# Patient Record
Sex: FEMALE | Race: BLACK OR AFRICAN AMERICAN | URBAN - METROPOLITAN AREA
[De-identification: names, ages, dates, MRNs, and addresses within clinical notes are randomized per-mention and may not be internally consistent; named-entity substitution may affect disease eponyms.]

---

## 2021-09-29 ENCOUNTER — OFFICE VISIT (OUTPATIENT)
Dept: URBAN - METROPOLITAN AREA CLINIC 115 | Facility: CLINIC | Age: 56
End: 2021-09-29

## 2021-10-25 ENCOUNTER — OFFICE VISIT (OUTPATIENT)
Dept: URBAN - METROPOLITAN AREA CLINIC 115 | Facility: CLINIC | Age: 56
End: 2021-10-25
Payer: COMMERCIAL

## 2021-10-25 ENCOUNTER — WEB ENCOUNTER (OUTPATIENT)
Dept: URBAN - METROPOLITAN AREA CLINIC 115 | Facility: CLINIC | Age: 56
End: 2021-10-25

## 2021-10-25 ENCOUNTER — LAB OUTSIDE AN ENCOUNTER (OUTPATIENT)
Dept: URBAN - METROPOLITAN AREA CLINIC 115 | Facility: CLINIC | Age: 56
End: 2021-10-25

## 2021-10-25 VITALS
TEMPERATURE: 97.1 F | DIASTOLIC BLOOD PRESSURE: 78 MMHG | HEIGHT: 58 IN | WEIGHT: 157.2 LBS | BODY MASS INDEX: 33 KG/M2 | SYSTOLIC BLOOD PRESSURE: 125 MMHG | HEART RATE: 77 BPM

## 2021-10-25 DIAGNOSIS — K44.9 HIATAL HERNIA: ICD-10-CM

## 2021-10-25 DIAGNOSIS — K22.10 EROSIVE ESOPHAGITIS: ICD-10-CM

## 2021-10-25 DIAGNOSIS — K59.09 CHANGE IN BOWEL MOVEMENTS INTERMITTENT CONSTIPATION. URGENCY IN THE MORNING.: ICD-10-CM

## 2021-10-25 DIAGNOSIS — R07.9 CHEST PAIN, UNSPECIFIED TYPE: ICD-10-CM

## 2021-10-25 DIAGNOSIS — R10.11 RUQ PAIN: ICD-10-CM

## 2021-10-25 DIAGNOSIS — R13.19 ESOPHAGEAL DYSPHAGIA: ICD-10-CM

## 2021-10-25 DIAGNOSIS — Z12.11 COLON CANCER SCREENING: ICD-10-CM

## 2021-10-25 PROCEDURE — 99244 OFF/OP CNSLTJ NEW/EST MOD 40: CPT | Performed by: INTERNAL MEDICINE

## 2021-10-25 PROCEDURE — 99204 OFFICE O/P NEW MOD 45 MIN: CPT | Performed by: INTERNAL MEDICINE

## 2021-10-25 RX ORDER — PANTOPRAZOLE SODIUM 40 MG/1
1 TABLET TABLET, DELAYED RELEASE ORAL ONCE A DAY
Qty: 30 TABLET | Refills: 3 | OUTPATIENT
Start: 2021-10-25

## 2021-10-25 RX ORDER — LINACLOTIDE 290 UG/1
1 CAPSULE AT LEAST 30 MINUTES BEFORE THE FIRST MEAL OF THE DAY ON AN EMPTY STOMACH CAPSULE, GELATIN COATED ORAL ONCE A DAY
Qty: 90 | Refills: 3 | OUTPATIENT
Start: 2021-10-25 | End: 2022-10-20

## 2021-10-25 NOTE — HPI-TODAY'S VISIT:
THROAT CATCHING FOOD, WHILE EAT FOOD GET CAUGHT, WHEN EATING BREADING, SLEEP MUSCLE CLOSES  NO SNORE  MEAT DONT GET STUCK. , BREAD GET EXPAND WITH WATER AND GET STUCK, GOING ON FOR 3 MONTHS,  2-3 TIMES A MONTH  IN PAST ESOPHAGUS STRETCHE,   DOES HAVE PAIN IN CHEST, AND BACK PAIN  DOES HAVE ACID COMING BACK IN THROAT.  RUQ AREA BURNING PAIN, BALL LIKE, FOR LONG TIME  BM CONSTIPATION. ONLY 2 TIMES A WEEK.

## 2021-10-26 ENCOUNTER — TELEPHONE ENCOUNTER (OUTPATIENT)
Dept: URBAN - METROPOLITAN AREA CLINIC 92 | Facility: CLINIC | Age: 56
End: 2021-10-26

## 2021-10-26 LAB
A/G RATIO: 1.6
ALBUMIN: 4.5
ALKALINE PHOSPHATASE: 75
ALT (SGPT): 16
AST (SGOT): 14
BASO (ABSOLUTE): 0
BASOS: 1
BILIRUBIN, TOTAL: 0.7
BUN/CREATININE RATIO: 12
BUN: 14
CALCIUM: 9.1
CARBON DIOXIDE, TOTAL: 23
CHLORIDE: 109
CREATININE: 1.15
EGFR IF AFRICN AM: 62
EGFR IF NONAFRICN AM: 54
EOS (ABSOLUTE): 0.1
EOS: 3
GLOBULIN, TOTAL: 2.8
GLUCOSE: 98
HEMATOCRIT: 32.8
HEMATOLOGY COMMENTS:: (no result)
HEMOGLOBIN: 10.7
IMMATURE CELLS: (no result)
IMMATURE GRANS (ABS): 0
IMMATURE GRANULOCYTES: 0
LIPASE: 22
LYMPHS (ABSOLUTE): 1.4
LYMPHS: 35
MCH: 30.1
MCHC: 32.6
MCV: 92
MONOCYTES(ABSOLUTE): 0.2
MONOCYTES: 6
NEUTROPHILS (ABSOLUTE): 2.2
NEUTROPHILS: 55
NRBC: (no result)
PLATELETS: 250
POTASSIUM: 4.2
PROTEIN, TOTAL: 7.3
RBC: 3.55
RDW: 14.3
SODIUM: 143
TSH: 1.85
WBC: 3.9

## 2021-10-28 ENCOUNTER — TELEPHONE ENCOUNTER (OUTPATIENT)
Dept: URBAN - METROPOLITAN AREA CLINIC 115 | Facility: CLINIC | Age: 56
End: 2021-10-28

## 2021-11-01 ENCOUNTER — LAB OUTSIDE AN ENCOUNTER (OUTPATIENT)
Dept: URBAN - METROPOLITAN AREA CLINIC 82 | Facility: CLINIC | Age: 56
End: 2021-11-01

## 2021-11-18 LAB
FERRITIN, SERUM: 274
IRON BIND.CAP.(TIBC): 288
IRON SATURATION: 22
IRON: 63
UIBC: 225
WRITTEN AUTHORIZATION: (no result)

## 2021-11-24 ENCOUNTER — TELEPHONE ENCOUNTER (OUTPATIENT)
Dept: URBAN - METROPOLITAN AREA CLINIC 115 | Facility: CLINIC | Age: 56
End: 2021-11-24

## 2021-12-08 ENCOUNTER — WEB ENCOUNTER (OUTPATIENT)
Dept: URBAN - METROPOLITAN AREA CLINIC 115 | Facility: CLINIC | Age: 56
End: 2021-12-08

## 2021-12-08 ENCOUNTER — OFFICE VISIT (OUTPATIENT)
Dept: URBAN - METROPOLITAN AREA CLINIC 115 | Facility: CLINIC | Age: 56
End: 2021-12-08
Payer: COMMERCIAL

## 2021-12-08 VITALS
DIASTOLIC BLOOD PRESSURE: 83 MMHG | WEIGHT: 159.6 LBS | SYSTOLIC BLOOD PRESSURE: 128 MMHG | HEART RATE: 86 BPM | HEIGHT: 58 IN | TEMPERATURE: 97.3 F | BODY MASS INDEX: 33.5 KG/M2

## 2021-12-08 DIAGNOSIS — D64.9 ANEMIA, UNSPECIFIED TYPE: ICD-10-CM

## 2021-12-08 DIAGNOSIS — Z12.11 COLON CANCER SCREENING: ICD-10-CM

## 2021-12-08 DIAGNOSIS — R07.9 CHEST PAIN, UNSPECIFIED TYPE: ICD-10-CM

## 2021-12-08 DIAGNOSIS — K22.10 EROSIVE ESOPHAGITIS: ICD-10-CM

## 2021-12-08 DIAGNOSIS — K44.9 HIATAL HERNIA: ICD-10-CM

## 2021-12-08 DIAGNOSIS — R13.19 ESOPHAGEAL DYSPHAGIA: ICD-10-CM

## 2021-12-08 DIAGNOSIS — R10.11 RUQ PAIN: ICD-10-CM

## 2021-12-08 DIAGNOSIS — K59.01 SLOW TRANSIT CONSTIPATION: ICD-10-CM

## 2021-12-08 PROBLEM — 40719004: Status: ACTIVE | Noted: 2021-10-25

## 2021-12-08 PROBLEM — 35298007: Status: ACTIVE | Noted: 2021-10-25

## 2021-12-08 PROBLEM — 40890009: Status: ACTIVE | Noted: 2021-10-25

## 2021-12-08 PROBLEM — 301717006: Status: ACTIVE | Noted: 2021-10-25

## 2021-12-08 PROBLEM — 84089009: Status: ACTIVE | Noted: 2021-10-25

## 2021-12-08 PROBLEM — 271737000: Status: ACTIVE | Noted: 2021-12-08

## 2021-12-08 PROCEDURE — 99214 OFFICE O/P EST MOD 30 MIN: CPT | Performed by: INTERNAL MEDICINE

## 2021-12-08 RX ORDER — PANTOPRAZOLE SODIUM 40 MG/1
1 TABLET TABLET, DELAYED RELEASE ORAL ONCE A DAY
Qty: 30 TABLET | Refills: 3 | Status: ACTIVE | COMMUNITY
Start: 2021-10-25

## 2021-12-08 RX ORDER — PANTOPRAZOLE SODIUM 40 MG/1
1 TABLET TABLET, DELAYED RELEASE ORAL ONCE A DAY
Qty: 30 TABLET | Refills: 3 | OUTPATIENT

## 2021-12-08 RX ORDER — LORATADINE 10 MG
1 PACKET MIXED WITH 8 OUNCES OF FLUID TABLET,DISINTEGRATING ORAL ONCE A DAY
Qty: 30 | OUTPATIENT
Start: 2021-12-08 | End: 2022-01-07

## 2021-12-08 RX ORDER — LINACLOTIDE 290 UG/1
1 CAPSULE AT LEAST 30 MINUTES BEFORE THE FIRST MEAL OF THE DAY ON AN EMPTY STOMACH CAPSULE, GELATIN COATED ORAL ONCE A DAY
Qty: 90 | Refills: 3 | Status: ACTIVE | COMMUNITY
Start: 2021-10-25 | End: 2022-10-20

## 2021-12-08 NOTE — HPI-TODAY'S VISIT:
GAVE LABS TO HER, TO TAKE TO PCP FOR ANEMIA, AND ELEVATED CREATITINE   LINZESS NOT HELPING, NEED TO STOP ,   RUQ PAIN, BLOATING  SWALLOWING IS GOOD, LITTLE REFLUX.

## 2022-01-03 ENCOUNTER — OFFICE VISIT (OUTPATIENT)
Dept: URBAN - METROPOLITAN AREA CLINIC 115 | Facility: CLINIC | Age: 57
End: 2022-01-03

## 2023-05-22 ENCOUNTER — OFFICE VISIT (OUTPATIENT)
Dept: URBAN - METROPOLITAN AREA CLINIC 17 | Facility: CLINIC | Age: 58
End: 2023-05-22

## 2023-05-22 RX ORDER — PANTOPRAZOLE SODIUM 40 MG/1
1 TABLET TABLET, DELAYED RELEASE ORAL ONCE A DAY
Qty: 30 TABLET | Refills: 3 | Status: ACTIVE | COMMUNITY

## 2023-06-05 ENCOUNTER — OFFICE VISIT (OUTPATIENT)
Dept: URBAN - METROPOLITAN AREA CLINIC 17 | Facility: CLINIC | Age: 58
End: 2023-06-05

## 2023-07-31 ENCOUNTER — OFFICE VISIT (OUTPATIENT)
Dept: URBAN - METROPOLITAN AREA CLINIC 17 | Facility: CLINIC | Age: 58
End: 2023-07-31

## 2023-09-11 ENCOUNTER — OFFICE VISIT (OUTPATIENT)
Dept: URBAN - METROPOLITAN AREA CLINIC 52 | Facility: CLINIC | Age: 58
End: 2023-09-11

## 2023-09-11 RX ORDER — PANTOPRAZOLE SODIUM 40 MG/1
1 TABLET TABLET, DELAYED RELEASE ORAL ONCE A DAY
Qty: 30 TABLET | Refills: 3 | Status: ON HOLD | COMMUNITY

## 2023-10-23 ENCOUNTER — LAB OUTSIDE AN ENCOUNTER (OUTPATIENT)
Dept: URBAN - METROPOLITAN AREA CLINIC 52 | Facility: CLINIC | Age: 58
End: 2023-10-23

## 2023-10-23 ENCOUNTER — OFFICE VISIT (OUTPATIENT)
Dept: URBAN - METROPOLITAN AREA CLINIC 52 | Facility: CLINIC | Age: 58
End: 2023-10-23
Payer: COMMERCIAL

## 2023-10-23 VITALS
BODY MASS INDEX: 31.02 KG/M2 | HEIGHT: 58 IN | HEART RATE: 85 BPM | OXYGEN SATURATION: 97 % | WEIGHT: 147.8 LBS | DIASTOLIC BLOOD PRESSURE: 70 MMHG | TEMPERATURE: 99.3 F | SYSTOLIC BLOOD PRESSURE: 116 MMHG

## 2023-10-23 DIAGNOSIS — G89.29 OTHER CHRONIC PAIN: ICD-10-CM

## 2023-10-23 DIAGNOSIS — K59.01 CONSTIPATION: ICD-10-CM

## 2023-10-23 DIAGNOSIS — R10.31 RIGHT LOWER QUADRANT PAIN: ICD-10-CM

## 2023-10-23 PROBLEM — 14760008: Status: ACTIVE | Noted: 2023-10-23

## 2023-10-23 PROBLEM — 82423001: Status: ACTIVE | Noted: 2023-10-23

## 2023-10-23 PROCEDURE — 99204 OFFICE O/P NEW MOD 45 MIN: CPT | Performed by: INTERNAL MEDICINE

## 2023-10-23 PROCEDURE — 99214 OFFICE O/P EST MOD 30 MIN: CPT | Performed by: INTERNAL MEDICINE

## 2023-10-23 RX ORDER — POLYETHYLENE GLYCOL 3350, SODIUM CHLORIDE, SODIUM BICARBONATE, POTASSIUM CHLORIDE 420; 11.2; 5.72; 1.48 G/4L; G/4L; G/4L; G/4L
AS DIRECTED POWDER, FOR SOLUTION ORAL
Qty: 4000 | Refills: 0 | OUTPATIENT
Start: 2023-10-23 | End: 2023-10-24

## 2023-10-23 RX ORDER — PANTOPRAZOLE SODIUM 40 MG/1
1 TABLET TABLET, DELAYED RELEASE ORAL ONCE A DAY
Qty: 30 TABLET | Refills: 3 | Status: ON HOLD | COMMUNITY

## 2023-10-23 RX ORDER — SENNOSIDES 8.6 MG/1
2 TABLETS AT BEDTIME AS NEEDED TABLET ORAL ONCE A DAY
Qty: 60 | Refills: 5 | OUTPATIENT
Start: 2023-10-23 | End: 2024-04-20

## 2023-10-23 NOTE — HPI-TODAY'S VISIT:
2 years bad pain RLQ, transient. Worsening though. Gas/belching Mucous in stool. Some dark red in stool. No diarrhea. Chronic constipation. Fiber not helpful.  8/2018-EGD-"B" esophagitis. 3 mm HH. Colonoscopy-IH. Divertics sigm. [id number]. Tablet passed easily. 5/2023-Hgb-11.2 Ultrasound 2022-"NL" (verbal per pt) CT (or MRI) 2022-"NL" (verbal per pt)

## 2023-11-03 ENCOUNTER — TELEPHONE ENCOUNTER (OUTPATIENT)
Dept: URBAN - METROPOLITAN AREA CLINIC 52 | Facility: CLINIC | Age: 58
End: 2023-11-03

## 2023-11-07 ENCOUNTER — TELEPHONE ENCOUNTER (OUTPATIENT)
Dept: URBAN - METROPOLITAN AREA CLINIC 52 | Facility: CLINIC | Age: 58
End: 2023-11-07

## 2023-11-13 ENCOUNTER — TELEPHONE ENCOUNTER (OUTPATIENT)
Dept: URBAN - METROPOLITAN AREA CLINIC 52 | Facility: CLINIC | Age: 58
End: 2023-11-13

## 2023-11-13 RX ORDER — SODIUM, POTASSIUM,MAG SULFATES 17.5-3.13G
AS DIRECTED SOLUTION, RECONSTITUTED, ORAL ORAL
OUTPATIENT
Start: 2023-11-14

## 2023-12-11 ENCOUNTER — TELEPHONE ENCOUNTER (OUTPATIENT)
Dept: URBAN - METROPOLITAN AREA CLINIC 52 | Facility: CLINIC | Age: 58
End: 2023-12-11

## 2023-12-12 ENCOUNTER — TELEPHONE ENCOUNTER (OUTPATIENT)
Dept: URBAN - METROPOLITAN AREA CLINIC 52 | Facility: CLINIC | Age: 58
End: 2023-12-12

## 2023-12-12 ENCOUNTER — LAB OUTSIDE AN ENCOUNTER (OUTPATIENT)
Dept: URBAN - METROPOLITAN AREA CLINIC 52 | Facility: CLINIC | Age: 58
End: 2023-12-12

## 2023-12-13 ENCOUNTER — CLAIMS CREATED FROM THE CLAIM WINDOW (OUTPATIENT)
Dept: URBAN - METROPOLITAN AREA SURGERY CENTER 17 | Facility: SURGERY CENTER | Age: 58
End: 2023-12-13
Payer: COMMERCIAL

## 2023-12-13 ENCOUNTER — OFFICE VISIT (OUTPATIENT)
Dept: URBAN - METROPOLITAN AREA SURGERY CENTER 17 | Facility: SURGERY CENTER | Age: 58
End: 2023-12-13

## 2023-12-13 ENCOUNTER — TELEPHONE ENCOUNTER (OUTPATIENT)
Dept: URBAN - METROPOLITAN AREA CLINIC 52 | Facility: CLINIC | Age: 58
End: 2023-12-13

## 2023-12-13 ENCOUNTER — CLAIMS CREATED FROM THE CLAIM WINDOW (OUTPATIENT)
Dept: URBAN - METROPOLITAN AREA CLINIC 4 | Facility: CLINIC | Age: 58
End: 2023-12-13
Payer: COMMERCIAL

## 2023-12-13 DIAGNOSIS — K31.89 OTHER DISEASES OF STOMACH AND DUODENUM: ICD-10-CM

## 2023-12-13 DIAGNOSIS — D50.9 IRON DEFICIENCY ANEMIA, UNSPECIFIED IRON DEFICIENCY ANEMIA TYPE: ICD-10-CM

## 2023-12-13 DIAGNOSIS — K59.09 OTHER CONSTIPATION: ICD-10-CM

## 2023-12-13 DIAGNOSIS — K25.9 GASTRIC ULCER: ICD-10-CM

## 2023-12-13 DIAGNOSIS — R10.31 RLQ ABDOMINAL PAIN: ICD-10-CM

## 2023-12-13 DIAGNOSIS — K62.89 HYPERTROPHIED ANAL PAPILLA: ICD-10-CM

## 2023-12-13 DIAGNOSIS — K22.10 ULCER OF ESOPHAGUS WITHOUT BLEEDING: ICD-10-CM

## 2023-12-13 DIAGNOSIS — K21.9 GASTRO-ESOPHAGEAL REFLUX DISEASE WITHOUT ESOPHAGITIS: ICD-10-CM

## 2023-12-13 DIAGNOSIS — K44.9 HIATAL HERNIA: ICD-10-CM

## 2023-12-13 DIAGNOSIS — K57.32 DIVERTICULITIS OF ASCENDING COLON: ICD-10-CM

## 2023-12-13 PROBLEM — 73481001: Status: ACTIVE | Noted: 2023-12-13

## 2023-12-13 PROCEDURE — 43239 EGD BIOPSY SINGLE/MULTIPLE: CPT | Performed by: INTERNAL MEDICINE

## 2023-12-13 PROCEDURE — 88312 SPECIAL STAINS GROUP 1: CPT | Performed by: PATHOLOGY

## 2023-12-13 PROCEDURE — 45378 DIAGNOSTIC COLONOSCOPY: CPT | Performed by: INTERNAL MEDICINE

## 2023-12-13 PROCEDURE — G8907 PT DOC NO EVENTS ON DISCHARG: HCPCS | Performed by: INTERNAL MEDICINE

## 2023-12-13 PROCEDURE — 88305 TISSUE EXAM BY PATHOLOGIST: CPT | Performed by: PATHOLOGY

## 2023-12-13 PROCEDURE — 00813 ANES UPR LWR GI NDSC PX: CPT | Performed by: NURSE ANESTHETIST, CERTIFIED REGISTERED

## 2023-12-13 RX ORDER — OMEPRAZOLE 40 MG/1
1 CAPSULE 30 MINUTES BEFORE MORNING MEAL CAPSULE, DELAYED RELEASE ORAL ONCE A DAY
Qty: 30 | Refills: 11 | OUTPATIENT
Start: 2023-12-13

## 2023-12-13 RX ORDER — SODIUM, POTASSIUM,MAG SULFATES 17.5-3.13G
AS DIRECTED SOLUTION, RECONSTITUTED, ORAL ORAL
Status: ACTIVE | COMMUNITY
Start: 2023-11-14

## 2023-12-13 RX ORDER — SENNOSIDES 8.6 MG/1
2 TABLETS AT BEDTIME AS NEEDED TABLET ORAL ONCE A DAY
Qty: 60 | Refills: 5 | Status: ACTIVE | COMMUNITY
Start: 2023-10-23 | End: 2024-04-20

## 2023-12-13 RX ORDER — PANTOPRAZOLE SODIUM 40 MG/1
1 TABLET TABLET, DELAYED RELEASE ORAL ONCE A DAY
Qty: 30 TABLET | Refills: 3 | Status: ON HOLD | COMMUNITY

## 2023-12-27 ENCOUNTER — TELEPHONE ENCOUNTER (OUTPATIENT)
Dept: URBAN - METROPOLITAN AREA CLINIC 52 | Facility: CLINIC | Age: 58
End: 2023-12-27

## 2024-01-08 ENCOUNTER — OFFICE VISIT (OUTPATIENT)
Dept: URBAN - METROPOLITAN AREA CLINIC 52 | Facility: CLINIC | Age: 59
End: 2024-01-08
Payer: COMMERCIAL

## 2024-01-08 ENCOUNTER — LAB OUTSIDE AN ENCOUNTER (OUTPATIENT)
Dept: URBAN - METROPOLITAN AREA CLINIC 52 | Facility: CLINIC | Age: 59
End: 2024-01-08

## 2024-01-08 ENCOUNTER — DASHBOARD ENCOUNTERS (OUTPATIENT)
Age: 59
End: 2024-01-08

## 2024-01-08 VITALS
WEIGHT: 145.4 LBS | BODY MASS INDEX: 30.52 KG/M2 | DIASTOLIC BLOOD PRESSURE: 74 MMHG | OXYGEN SATURATION: 98 % | HEART RATE: 81 BPM | HEIGHT: 58 IN | TEMPERATURE: 97 F | SYSTOLIC BLOOD PRESSURE: 126 MMHG

## 2024-01-08 DIAGNOSIS — N30.90 CYSTITIS: ICD-10-CM

## 2024-01-08 DIAGNOSIS — K76.0 FATTY LIVER: ICD-10-CM

## 2024-01-08 DIAGNOSIS — K22.10 EROSIVE ESOPHAGITIS: ICD-10-CM

## 2024-01-08 DIAGNOSIS — R16.0 HEPATOMEGALY: ICD-10-CM

## 2024-01-08 PROBLEM — 197321007: Status: ACTIVE | Noted: 2024-01-08

## 2024-01-08 LAB
APPEARANCE: CLEAR
BILIRUBIN: NEGATIVE
COLOR: YELLOW
GLUCOSE: NEGATIVE
KETONES: NEGATIVE
LEUKOCYTE ESTERASE: NEGATIVE
NITRITE: NEGATIVE
OCCULT BLOOD: NEGATIVE
PH: 5.5
PROTEIN: NEGATIVE
SPECIFIC GRAVITY: 1.01

## 2024-01-08 PROCEDURE — 99213 OFFICE O/P EST LOW 20 MIN: CPT | Performed by: INTERNAL MEDICINE

## 2024-01-08 RX ORDER — SENNOSIDES 8.6 MG/1
2 TABLETS AT BEDTIME AS NEEDED TABLET ORAL ONCE A DAY
Qty: 60 | Refills: 5 | Status: ACTIVE | COMMUNITY
Start: 2023-10-23 | End: 2024-04-20

## 2024-01-08 RX ORDER — PANTOPRAZOLE SODIUM 40 MG/1
1 TABLET TABLET, DELAYED RELEASE ORAL ONCE A DAY
Qty: 30 TABLET | Refills: 3 | Status: ON HOLD | COMMUNITY

## 2024-01-08 RX ORDER — OMEPRAZOLE 40 MG/1
1 CAPSULE 30 MINUTES BEFORE MORNING MEAL CAPSULE, DELAYED RELEASE ORAL ONCE A DAY
Qty: 30 | Refills: 11 | Status: ACTIVE | COMMUNITY
Start: 2023-12-13

## 2024-01-08 RX ORDER — SODIUM, POTASSIUM,MAG SULFATES 17.5-3.13G
AS DIRECTED SOLUTION, RECONSTITUTED, ORAL ORAL
Status: ACTIVE | COMMUNITY
Start: 2023-11-14

## 2024-01-08 NOTE — HPI-TODAY'S VISIT:
2 years bad pain RLQ, transient. Worsening though. Gas/belching Mucous in stool. Some dark red in stool. No diarrhea. Chronic constipation. Fiber not helpful.  8/2018-EGD-"B" esophagitis. 3 mm HH. Colonoscopy-IH. Divertics sigm. [id number]. Tablet passed easily. 5/2023-Hgb-11.2 Ultrasound 2022-"NL" (verbal per pt) CT (or MRI) 2022-"NL" (verbal per pt) 12/13/2355-Hmkjjpxjfgu-RX x minor divertics. EGD-Large HH. 4 mm ulcer at GEJ. BX: E-benign. G-NL. D-NL. [id number]. Small lesions not identifiable. Bladder with thick wall. Subpleural thickening.  Fatty liver.

## 2024-01-09 LAB
A/G RATIO: 1.6
ABSOLUTE BASOPHILS: 31
ABSOLUTE EOSINOPHILS: 88
ABSOLUTE LYMPHOCYTES: 1445
ABSOLUTE MONOCYTES: 170
ABSOLUTE NEUTROPHILS: 1666
ALBUMIN: 4.5
ALKALINE PHOSPHATASE: 69
ALT (SGPT): 12
AST (SGOT): 12
BASOPHILS: 0.9
BILIRUBIN, TOTAL: 0.9
BUN/CREATININE RATIO: (no result)
BUN: 15
C-REACTIVE PROTEIN, QUANT: 2.4
CALCIUM: 9.4
CARBON DIOXIDE, TOTAL: 27
CHLORIDE: 107
CREATININE: 1.02
EGFR: 65
EOSINOPHILS: 2.6
GLOBULIN, TOTAL: 2.9
GLUCOSE: 91
HEMATOCRIT: 33.1
HEMOGLOBIN: 11
LYMPHOCYTES: 42.5
MCH: 30.1
MCHC: 33.2
MCV: 90.7
MONOCYTES: 5
MPV: 10.5
NEUTROPHILS: 49
PLATELET COUNT: 226
POTASSIUM: 4.3
PROTEIN, TOTAL: 7.4
RDW: 13.8
RED BLOOD CELL COUNT: 3.65
SODIUM: 142
WHITE BLOOD CELL COUNT: 3.4

## 2024-03-07 ENCOUNTER — LAB (OUTPATIENT)
Dept: URBAN - METROPOLITAN AREA CLINIC 52 | Facility: CLINIC | Age: 59
End: 2024-03-07